# Patient Record
Sex: FEMALE | Race: WHITE | NOT HISPANIC OR LATINO | URBAN - METROPOLITAN AREA
[De-identification: names, ages, dates, MRNs, and addresses within clinical notes are randomized per-mention and may not be internally consistent; named-entity substitution may affect disease eponyms.]

---

## 2017-05-18 ENCOUNTER — IMPORTED ENCOUNTER (OUTPATIENT)
Dept: URBAN - METROPOLITAN AREA CLINIC 1 | Facility: CLINIC | Age: 77
End: 2017-05-18

## 2017-05-18 PROBLEM — H43.813: Noted: 2017-05-18

## 2017-05-18 PROBLEM — H35.033: Noted: 2017-05-18

## 2017-05-18 PROBLEM — H01.002: Noted: 2017-05-18

## 2017-05-18 PROBLEM — H35.3134: Noted: 2017-05-18

## 2017-05-18 PROBLEM — H04.123: Noted: 2017-05-18

## 2017-05-18 PROBLEM — Z96.1: Noted: 2017-05-18

## 2017-05-18 PROBLEM — H16.143: Noted: 2017-05-18

## 2017-05-18 PROBLEM — H01.005: Noted: 2017-05-18

## 2017-05-18 PROCEDURE — 92015 DETERMINE REFRACTIVE STATE: CPT

## 2017-05-18 PROCEDURE — 92014 COMPRE OPH EXAM EST PT 1/>: CPT

## 2017-05-18 NOTE — PATIENT DISCUSSION
1.  ARMD OU advanced with subfoveal involvement/dry/stable. Followed by Dr. Elio Barney. Importance of daily AREDS II study multivitamin and Amsler Grid checks discussed with patient. Patient to follow-up immediately with any new onset of decreased vision and/or metamorphopsia. 2. CARLOS w/ PEK OU- Stable. The continuation of artificial tears were recommended. 3.  Blepharitis posterior type OU- Stable. Continue daily warm compresses and lid scrubs were recommended. 4. GR I Hypertensive Retinopathy OU- Stable continue HTN Control5. PVD OU- No tears. RD precautions. 6.  Pseudophakia OU- H/o Yag OU. 7.  Return for an appointment for a 27 in 1 year with Dr. Margie Phan.

## 2018-05-25 ENCOUNTER — IMPORTED ENCOUNTER (OUTPATIENT)
Dept: URBAN - METROPOLITAN AREA CLINIC 1 | Facility: CLINIC | Age: 78
End: 2018-05-25

## 2018-05-25 PROBLEM — H35.3211: Noted: 2018-05-25

## 2018-05-25 PROBLEM — H35.3124: Noted: 2018-05-25

## 2018-05-25 PROCEDURE — 92014 COMPRE OPH EXAM EST PT 1/>: CPT

## 2018-05-25 PROCEDURE — 92015 DETERMINE REFRACTIVE STATE: CPT

## 2018-05-25 NOTE — PATIENT DISCUSSION
ARMD OS advanced with subfoveal involvement/dry/stable. Importance of daily AREDS II study multivitamin and Amsler Grid checks discussed with patient. Patient to follow-up immediately with any new onset of decreased vision and/or metamorphopsia.

## 2018-05-25 NOTE — PATIENT DISCUSSION
1.  Wet ARMD OD: H/o Injections OD by . F/u as scheduled with Dr. Omar Sagastume. 2.  ARMD OS Advanced with subfoveal involvement/dry/stable. Importance of daily AREDS II study multivitamin and Amsler Grid checks discussed with patient. Patient to follow-up immediately with any new onset of decreased vision and/or metamorphopsia. 3. CARLOS w/ increased PEK OU- Use ATs TID OU Routinely. 4.  Anterior Blepharitis OU-  Continue daily warm compresses and lid scrubs were recommended. 5. GR I Hypertensive Retinopathy OU- Stable continue HTN Control6. PVD OU- Stable RD precautions. 7.  Pseudophakia OU- H/o YAG Cap OU. Letter to PCP Return for an appointment in 1 yr 27 with Dr. Colton Beverly.

## 2019-05-24 ENCOUNTER — IMPORTED ENCOUNTER (OUTPATIENT)
Dept: URBAN - METROPOLITAN AREA CLINIC 1 | Facility: CLINIC | Age: 79
End: 2019-05-24

## 2019-05-24 PROBLEM — Z96.1: Noted: 2019-05-24

## 2019-05-24 PROBLEM — H35.3123: Noted: 2019-05-24

## 2019-05-24 PROBLEM — H35.033: Noted: 2019-05-24

## 2019-05-24 PROBLEM — H35.3211: Noted: 2019-05-24

## 2019-05-24 PROBLEM — H01.001: Noted: 2019-05-24

## 2019-05-24 PROBLEM — H35.3124: Noted: 2019-05-24

## 2019-05-24 PROBLEM — H04.123: Noted: 2019-05-24

## 2019-05-24 PROBLEM — H16.143: Noted: 2019-05-24

## 2019-05-24 PROBLEM — H43.813: Noted: 2019-05-24

## 2019-05-24 PROBLEM — H01.004: Noted: 2019-05-24

## 2019-05-24 PROCEDURE — 92015 DETERMINE REFRACTIVE STATE: CPT

## 2019-05-24 PROCEDURE — 92014 COMPRE OPH EXAM EST PT 1/>: CPT

## 2019-05-24 NOTE — PATIENT DISCUSSION
1.  Wet ARMD OD- Stable. H/o injections OD by Dr. Destiney Rubio. Follow up with Dr. Destiney Rubio as scheduled. 2. ARMD OS Advanced with subfoveal involvement/dry/stable. Importance of daily AREDS II study multivitamin and Amsler Grid checks discussed with patient. Patient to follow-up immediately with any new onset of decreased vision and/or metamorphopsia. 3. CARLOS w/ PEK OU- Recommend ATs TID OU routinely 4. Anterior Blepharitis OU - Daily Hot compresses and lid scrubs were recommended. 5. Pseudophakia OU - H/o YAG Cap OU 6. GR I Hypertensive Retinopathy OU- Stable continue HTN Control7. PVD OU - RD precautions. 8.  H/o LASIK OU  MRX for glasses given. Return for an appointment in 1 year 27 with Dr. Isaac Quintero.

## 2019-05-24 NOTE — PATIENT DISCUSSION
ARMD OS Advanced w/o subfoveal involvement/dry/stable. Importance of daily AREDS II study multivitamin and Amsler Grid checks discussed with patient. Patient to follow-up immediately with any new onset of decreased vision and/or metamorphopsia.

## 2019-05-24 NOTE — PATIENT DISCUSSION
Dry Eyes--Both Eyes-Patient to continue Artificial Tear use. Punctal Plugs also recommended to lower lid puctums to help retain moisture to the corneal surface. Risks and benefits discussed with patient and patient states fully understanding. Patient would like to proceed with punctal plug insertion.

## 2019-08-02 NOTE — PATIENT DISCUSSION
YEARLY OCT, F AND ON EVAL WITH DR. VILLASENOR. IF CHANGES, SEND BACK TO [address] [facility], [place].

## 2020-05-29 ENCOUNTER — IMPORTED ENCOUNTER (OUTPATIENT)
Dept: URBAN - METROPOLITAN AREA CLINIC 1 | Facility: CLINIC | Age: 80
End: 2020-05-29

## 2020-05-29 PROBLEM — H04.123: Noted: 2020-05-29

## 2020-05-29 PROBLEM — H16.143: Noted: 2020-05-29

## 2020-05-29 PROBLEM — Z96.1: Noted: 2020-05-29

## 2020-05-29 PROBLEM — H35.3124: Noted: 2020-05-29

## 2020-05-29 PROBLEM — H43.813: Noted: 2020-05-29

## 2020-05-29 PROBLEM — H35.3212: Noted: 2020-05-29

## 2020-05-29 PROBLEM — H01.004: Noted: 2020-05-29

## 2020-05-29 PROBLEM — H01.001: Noted: 2020-05-29

## 2020-05-29 PROBLEM — H35.033: Noted: 2020-05-29

## 2020-05-29 PROCEDURE — 92014 COMPRE OPH EXAM EST PT 1/>: CPT

## 2020-05-29 PROCEDURE — 92015 DETERMINE REFRACTIVE STATE: CPT

## 2020-05-29 NOTE — PATIENT DISCUSSION
1.  Wet ARMD OD- Stable H/o injections OD by Dr. Jeanne Abernathy. Follow up with Dr. Jeanne Abernathy as scheduled. 2. ARMD OS Advanced with subfoveal involvement/dry/stable. Importance of daily AREDS II study multivitamin and Amsler Grid checks discussed with patient. Patient to follow-up immediately with any new onset of decreased vision and/or metamorphopsia. 3. CARLOS w/ PEK OU- Recommend ATs TID OU routinely 4. Pseudophakia OU - H/o YAG Cap OU 5. Anterior Blepharitis OU - Daily Hot compresses and lid scrubs were recommended. 6. GR I Hypertensive Retinopathy OU- Stable continue HTN Control7. PVD OU - RD precautions. 8.  H/o LASIK OU  MRX for glasses given. Return for an appointment in 6 months 10/DFE with Dr. Adair Roman.

## 2020-09-21 NOTE — PATIENT DISCUSSION
"""Follow drusen without treatment. Call if vision or distortion increases.  Recommend regular amsler checks."" ""OCT Macula: OD: Stable

## 2020-10-13 NOTE — PATIENT DISCUSSION
"""S/P IOL OS: Sensar AAB00 +22.0 (Target: Distance) +Omidria.  Continue post operative instructions and drops per schedule. "" ""Increase Pred-Moxi-Nepaf left eye three times a day

## 2020-10-19 NOTE — PATIENT DISCUSSION
"""S/P IOL OS: Sensar AAB00 +22.0 (Target: Distance) +Omidria. Continue post operative instructions and drops per schedule.  """

## 2020-10-27 NOTE — PATIENT DISCUSSION
"""S/P IOL OD: Sensar AAB00 +22.00 +Omidria.  Continue post operative instructions and drops per schedule. "" ""Increase Pred-Moxi-Nepaf right eye three times a day

## 2020-11-02 NOTE — PATIENT DISCUSSION
"""S/P IOL OD: Sensar AAB00 +22.00 +Omidria.  Continue post operative instructions and drops per schedule. "" ""Continue Pred-Moxi-Nepaf right eye three times a day

## 2021-04-06 ENCOUNTER — IMPORTED ENCOUNTER (OUTPATIENT)
Dept: URBAN - METROPOLITAN AREA CLINIC 1 | Facility: CLINIC | Age: 81
End: 2021-04-06

## 2021-04-06 PROBLEM — H16.143: Noted: 2021-04-06

## 2021-04-06 PROBLEM — H35.3124: Noted: 2021-04-06

## 2021-04-06 PROBLEM — H35.3211: Noted: 2021-04-06

## 2021-04-06 PROBLEM — H01.004: Noted: 2021-04-06

## 2021-04-06 PROBLEM — H04.123: Noted: 2021-04-06

## 2021-04-06 PROBLEM — H01.001: Noted: 2021-04-06

## 2021-04-06 PROCEDURE — 99214 OFFICE O/P EST MOD 30 MIN: CPT

## 2021-04-06 NOTE — PATIENT DISCUSSION
1.  Wet ARMD OD - Stable H/o injections OD by Dr. Destiney Rubio. Follow up with Dr. Destiney Rubio as scheduled. 2. ARMD OS Advanced with subfoveal involvement/dry/stable. Importance of daily AREDS II study multivitamin and Amsler Grid checks discussed with patient. Patient to follow-up immediately with any new onset of decreased vision and/or metamorphopsia. 3. CARLOS w/ PEK OU- Continue ATs TID OU routinely 4. Pseudophakia OU - H/o YAG Cap OU 5. Anterior Blepharitis OU - Daily Hot compresses and lid scrubs were recommended. 6. GR I Hypertensive Retinopathy OU- Stable continue HTN Control7. PVD OU - RD precautions. 8.  H/o LASIK OU  Return for an appointment in 1 year for a 30 (follow yearly since pt followed by [facility] q 6mos) with Dr. Isaac Quintero.

## 2021-06-04 NOTE — PATIENT DISCUSSION
HVF NEW BASELINE, STABLE COMPARED TO 2019.  CONTINUE TO FOLLOW YEARLY W/ DR Trimble President AND MARY JOK WILL SEE PT YEARLY AS WELL.

## 2022-04-03 ASSESSMENT — VISUAL ACUITY
OD_SC: 20/25
OD_SC: 20/25
OS_SC: 20/25
OS_SC: 20/30
OS_CC: J1
OD_CC: J1
OD_SC: 20/20
OD_SC: 20/20
OS_SC: 20/20
OS_CC: J1
OS_SC: 20/20-2
OS_SC: 20/20-1
OD_SC: 20/20
OD_CC: J1

## 2022-04-03 ASSESSMENT — TONOMETRY
OS_IOP_MMHG: 9
OD_IOP_MMHG: 14
OD_IOP_MMHG: 9
OD_IOP_MMHG: 11
OS_IOP_MMHG: 14
OD_IOP_MMHG: 10
OS_IOP_MMHG: 10
OS_IOP_MMHG: 10
OD_IOP_MMHG: 10
OS_IOP_MMHG: 10

## 2022-04-05 ENCOUNTER — COMPREHENSIVE EXAM (OUTPATIENT)
Dept: URBAN - METROPOLITAN AREA CLINIC 1 | Facility: CLINIC | Age: 82
End: 2022-04-05

## 2022-04-05 DIAGNOSIS — Z96.1: ICD-10-CM

## 2022-04-05 DIAGNOSIS — H35.3124: ICD-10-CM

## 2022-04-05 DIAGNOSIS — H35.3211: ICD-10-CM

## 2022-04-05 DIAGNOSIS — H16.143: ICD-10-CM

## 2022-04-05 DIAGNOSIS — H04.123: ICD-10-CM

## 2022-04-05 PROCEDURE — 99214 OFFICE O/P EST MOD 30 MIN: CPT

## 2022-04-05 ASSESSMENT — TONOMETRY
OD_IOP_MMHG: 14
OS_IOP_MMHG: 14

## 2022-04-05 ASSESSMENT — VISUAL ACUITY
OS_CC: 20/25
OD_CC: 20/20

## 2022-04-05 NOTE — PATIENT DISCUSSION
(Active CNV) Stable with active choroidal neovascularization. H/o injections OD. Patient to keep appointments with Dr. Prema Ochoa.

## 2022-06-10 NOTE — PATIENT DISCUSSION
OCT BASELINE OU.   RECOMMEND PT CONTINUE TO MONITOR EVERY 4-6 MONTHS W/ OCT, HVF AND OPTIC NERVE EVAL.  IF CHANGES, SEND BACK TO [facility].

## 2023-04-24 ENCOUNTER — COMPREHENSIVE EXAM (OUTPATIENT)
Dept: URBAN - METROPOLITAN AREA CLINIC 1 | Facility: CLINIC | Age: 83
End: 2023-04-24

## 2023-04-24 DIAGNOSIS — H04.123: ICD-10-CM

## 2023-04-24 DIAGNOSIS — H35.3124: ICD-10-CM

## 2023-04-24 DIAGNOSIS — H01.024: ICD-10-CM

## 2023-04-24 DIAGNOSIS — H43.813: ICD-10-CM

## 2023-04-24 DIAGNOSIS — H35.3211: ICD-10-CM

## 2023-04-24 DIAGNOSIS — H16.143: ICD-10-CM

## 2023-04-24 DIAGNOSIS — Z96.1: ICD-10-CM

## 2023-04-24 DIAGNOSIS — H01.021: ICD-10-CM

## 2023-04-24 DIAGNOSIS — H10.45: ICD-10-CM

## 2023-04-24 PROCEDURE — 92015 DETERMINE REFRACTIVE STATE: CPT

## 2023-04-24 PROCEDURE — 99214 OFFICE O/P EST MOD 30 MIN: CPT

## 2023-04-24 ASSESSMENT — VISUAL ACUITY
OD_CC: J1
OS_CC: 20/25
OD_CC: 20/20
OS_CC: J1

## 2023-04-24 ASSESSMENT — TONOMETRY
OS_IOP_MMHG: 14
OD_IOP_MMHG: 14

## 2024-04-29 ENCOUNTER — COMPREHENSIVE EXAM (OUTPATIENT)
Dept: URBAN - METROPOLITAN AREA CLINIC 1 | Facility: CLINIC | Age: 84
End: 2024-04-29

## 2024-04-29 DIAGNOSIS — H35.3124: ICD-10-CM

## 2024-04-29 DIAGNOSIS — H35.3211: ICD-10-CM

## 2024-04-29 PROCEDURE — 99214 OFFICE O/P EST MOD 30 MIN: CPT

## 2024-04-29 ASSESSMENT — VISUAL ACUITY
OD_CC: 20/20
OS_CC: 20/25

## 2024-04-29 ASSESSMENT — TONOMETRY
OS_IOP_MMHG: 14
OD_IOP_MMHG: 14

## 2025-05-01 ENCOUNTER — COMPREHENSIVE EXAM (OUTPATIENT)
Age: 85
End: 2025-05-01

## 2025-05-01 DIAGNOSIS — Z96.1: ICD-10-CM

## 2025-05-01 DIAGNOSIS — H43.813: ICD-10-CM

## 2025-05-01 DIAGNOSIS — H35.3211: ICD-10-CM

## 2025-05-01 DIAGNOSIS — Z98.890: ICD-10-CM

## 2025-05-01 DIAGNOSIS — H10.45: ICD-10-CM

## 2025-05-01 DIAGNOSIS — H01.024: ICD-10-CM

## 2025-05-01 DIAGNOSIS — H04.123: ICD-10-CM

## 2025-05-01 DIAGNOSIS — H01.021: ICD-10-CM

## 2025-05-01 DIAGNOSIS — H35.3124: ICD-10-CM

## 2025-05-01 DIAGNOSIS — H16.143: ICD-10-CM

## 2025-05-01 PROCEDURE — 99214 OFFICE O/P EST MOD 30 MIN: CPT

## 2025-05-01 PROCEDURE — 92015 DETERMINE REFRACTIVE STATE: CPT
